# Patient Record
Sex: FEMALE | Race: WHITE | Employment: FULL TIME | URBAN - METROPOLITAN AREA
[De-identification: names, ages, dates, MRNs, and addresses within clinical notes are randomized per-mention and may not be internally consistent; named-entity substitution may affect disease eponyms.]

---

## 2022-05-27 ENCOUNTER — HOSPITAL ENCOUNTER (EMERGENCY)
Age: 52
Discharge: HOME | End: 2022-05-27
Attending: EMERGENCY MEDICINE
Payer: COMMERCIAL

## 2022-05-27 ENCOUNTER — APPOINTMENT (OUTPATIENT)
Dept: GENERAL RADIOLOGY | Age: 52
End: 2022-05-27
Attending: EMERGENCY MEDICINE
Payer: COMMERCIAL

## 2022-05-27 VITALS
OXYGEN SATURATION: 97 % | BODY MASS INDEX: 35.92 KG/M2 | TEMPERATURE: 97.6 F | WEIGHT: 236.99 LBS | HEART RATE: 89 BPM | RESPIRATION RATE: 16 BRPM | HEIGHT: 68 IN

## 2022-05-27 DIAGNOSIS — S80.11XA LEG HEMATOMA, RIGHT, INITIAL ENCOUNTER: ICD-10-CM

## 2022-05-27 DIAGNOSIS — T14.8XXA ABRASION: Primary | ICD-10-CM

## 2022-05-27 PROCEDURE — 73590 X-RAY EXAM OF LOWER LEG: CPT

## 2022-05-27 PROCEDURE — 99283 EMERGENCY DEPT VISIT LOW MDM: CPT

## 2022-05-27 RX ORDER — CEPHALEXIN 500 MG/1
500 CAPSULE ORAL 3 TIMES DAILY
Qty: 15 CAPSULE | Refills: 0 | Status: SHIPPED | OUTPATIENT
Start: 2022-05-27

## 2022-05-27 RX ORDER — CEPHALEXIN 500 MG/1
500 CAPSULE ORAL 3 TIMES DAILY
Qty: 15 CAPSULE | Refills: 0 | OUTPATIENT
Start: 2022-05-27 | End: 2022-05-27 | Stop reason: SDUPTHER

## 2022-05-27 NOTE — ED NOTES
I have reviewed discharge instructions with the patient. The patient verbalized understanding. Pt ambulatory out of ER.

## 2022-05-27 NOTE — ED PROVIDER NOTES
EMERGENCY DEPARTMENT HISTORY AND PHYSICAL EXAM      Date: 5/27/2022  Patient Name: Vitaliy Her    History of Presenting Illness     Chief Complaint   Patient presents with    Fall     Pt arrives ambulatory to triage, reports mechanical fall onto a metal bench, reports pain in R shin. History Provided By: Patient    HPI: Vitaliy Her, 46 y.o. female presents to the ED with cc of right lower leg pain. Patient states that last night she had a mechanical fall resulting in her leg coming down on the edge of a metal bench. She states that she developed a small cut at that time. She states pain rated at 8 out of 10 this morning she states there is some localized swelling to the tissue. She states she is not taking anything for pain. She states she did ice it last night. Her tetanus is up-to-date. Pain is worsened with weightbearing activities touch and movement. She denies any other injuries as there has been no other illnesses. There are no other complaints, changes, or physical findings at this time. PCP: Carmelo Mejia    No current facility-administered medications on file prior to encounter. Current Outpatient Medications on File Prior to Encounter   Medication Sig Dispense Refill    multivitamins-minerals-lutein (CENTRUM SILVER) Tab Take  by mouth daily.  rxhbxhrbo-ul-gumzqnxj-guaifen (SUDAFED PE COLD & COUGH) 5--100 mg Tab Take  by mouth daily.  HYDROcodone-acetaminophen (LORTAB 7.5) 7.5-500 mg per tablet Take 1 Tab by mouth every four (4) hours as needed for Pain. 20 Tab 0       Past History     Past Medical History:  Past Medical History:   Diagnosis Date    Arthritis     neck       Past Surgical History:  Past Surgical History:   Procedure Laterality Date    HX CHOLECYSTECTOMY      NEUROLOGICAL PROCEDURE UNLISTED      gamma knife for Trigeminal neuralgia       Family History:  No family history on file.     Social History:  Social History     Tobacco Use    Smoking status: Never Smoker    Smokeless tobacco: Not on file   Substance Use Topics    Alcohol use: No    Drug use: Not on file       Allergies:  No Known Allergies      Review of Systems   Review of Systems   Constitutional: Negative. Negative for appetite change, chills, fatigue and fever. HENT: Negative. Negative for congestion, rhinorrhea, sinus pressure and sore throat. Eyes: Negative. Respiratory: Negative. Negative for cough, choking, chest tightness, shortness of breath and wheezing. Cardiovascular: Negative. Negative for chest pain, palpitations and leg swelling. Gastrointestinal: Negative for abdominal pain, constipation, diarrhea, nausea and vomiting. Endocrine: Negative. Genitourinary: Negative. Negative for difficulty urinating, dysuria, flank pain and urgency. Musculoskeletal:        Right shin pain      Skin: Positive for wound. Neurological: Negative. Negative for dizziness, speech difficulty, weakness, light-headedness, numbness and headaches. Psychiatric/Behavioral: Negative. All other systems reviewed and are negative. Physical Exam   Physical Exam  Vitals and nursing note reviewed. Constitutional:       Appearance: Normal appearance. She is obese. She is not ill-appearing. HENT:      Head: Normocephalic and atraumatic. Mouth/Throat:      Mouth: Mucous membranes are moist.   Eyes:      Extraocular Movements: Extraocular movements intact. Conjunctiva/sclera: Conjunctivae normal.      Pupils: Pupils are equal, round, and reactive to light. Cardiovascular:      Rate and Rhythm: Normal rate and regular rhythm. Pulses: Normal pulses. Pulmonary:      Effort: Pulmonary effort is normal.      Breath sounds: Normal breath sounds. Musculoskeletal:      Cervical back: Normal range of motion and neck supple. Legs:    Skin:     General: Skin is warm and dry. Capillary Refill: Capillary refill takes less than 2 seconds. Neurological:      Mental Status: She is alert and oriented to person, place, and time. Cranial Nerves: No cranial nerve deficit. Comments: No gross motor or sensory deficits     Psychiatric:         Mood and Affect: Mood normal.         Behavior: Behavior normal.         Diagnostic Study Results     Labs -  None    Radiologic Studies -   XR TIB/FIB RT   Final Result   No acute abnormality. CT Results  (Last 48 hours)    None        CXR Results  (Last 48 hours)    None          Medical Decision Making   I am the first provider for this patient. I reviewed the vital signs, available nursing notes, past medical history, past surgical history, family history and social history. Vital Signs-Reviewed the patient's vital signs. Patient Vitals for the past 12 hrs:   Temp Pulse Resp SpO2   05/27/22 0952 97.6 °F (36.4 °C) 89 16 97 %       Records Reviewed: Nursing Notes, Old Medical Records, Previous Radiology Studies and Previous Laboratory Studies, last ED visit 2013,  Tetanus up to date      Provider Notes (Medical Decision Making):   DDx-hematoma, fracture, laceration    ED Course:   Initial assessment performed. The patients presenting problems have been discussed, and they are in agreement with the care plan formulated and outlined with them. I have encouraged them to ask questions as they arise throughout their visit. Xray negative, Wound closure not necessary but given pt is headed to beach will wound cleaned with sterile saliine. Dermabond placed over wound to provide barrier. Ace warp applied for compression secondary to underlying hematoma. Discussed with pt keep wound clean. Ice 3-4x a day for2 days, Ace wrap for compression. Discussed keeping propped up when notup walking. Ab Rx written, instructed pt to hold unless she begins to notice redness, warmth surrounding the area. Disposition:  Discharge    DISCHARGE PLAN:  1.    Discharge Medication List as of 5/27/2022 11:40 AM CONTINUE these medications which have NOT CHANGED    Details   multivitamins-minerals-lutein (CENTRUM SILVER) Tab Take  by mouth daily. Historical Med      sskikglzu-dw-aetwiwuu-guaifen (SUDAFED PE COLD & COUGH) 5--100 mg Tab Take  by mouth daily. Historical Med      HYDROcodone-acetaminophen (LORTAB 7.5) 7.5-500 mg per tablet Take 1 Tab by mouth every four (4) hours as needed for Pain. Print, 1 Tab, Disp-20 Tab, R-0           2. Follow-up Information     Follow up With Specialties Details Why [address] [place] [address]          3. Return to ED if worse     Diagnosis     Clinical Impression:   1. Abrasion    2. Leg hematoma, right, initial encounter        Attestations:    Luis Robles, DO    Please note that this dictation was completed with [employer], the computer voice recognition software. Quite often unanticipated grammatical, syntax, homophones, and other interpretive errors are inadvertently transcribed by the computer software. Please disregard these errors. Please excuse any errors that have escaped final proofreading. Thank you.